# Patient Record
Sex: MALE | Race: WHITE | NOT HISPANIC OR LATINO | Employment: STUDENT | URBAN - METROPOLITAN AREA
[De-identification: names, ages, dates, MRNs, and addresses within clinical notes are randomized per-mention and may not be internally consistent; named-entity substitution may affect disease eponyms.]

---

## 2017-01-26 ENCOUNTER — OFFICE VISIT (OUTPATIENT)
Dept: INTERNAL MEDICINE | Facility: CLINIC | Age: 22
End: 2017-01-26
Payer: COMMERCIAL

## 2017-01-26 VITALS
WEIGHT: 149.06 LBS | HEIGHT: 70 IN | DIASTOLIC BLOOD PRESSURE: 68 MMHG | BODY MASS INDEX: 21.34 KG/M2 | TEMPERATURE: 98 F | HEART RATE: 76 BPM | SYSTOLIC BLOOD PRESSURE: 107 MMHG

## 2017-01-26 DIAGNOSIS — K52.9 IBD (INFLAMMATORY BOWEL DISEASE): ICD-10-CM

## 2017-01-26 DIAGNOSIS — F41.9 ANXIETY: ICD-10-CM

## 2017-01-26 DIAGNOSIS — J45.990 ASTHMA, EXERCISE INDUCED: Primary | ICD-10-CM

## 2017-01-26 DIAGNOSIS — G47.00 INSOMNIA, UNSPECIFIED TYPE: ICD-10-CM

## 2017-01-26 PROCEDURE — 99999 PR PBB SHADOW E&M-EST. PATIENT-LVL IV: CPT | Mod: PBBFAC,,, | Performed by: STUDENT IN AN ORGANIZED HEALTH CARE EDUCATION/TRAINING PROGRAM

## 2017-01-26 PROCEDURE — 99203 OFFICE O/P NEW LOW 30 MIN: CPT | Mod: S$GLB,,, | Performed by: STUDENT IN AN ORGANIZED HEALTH CARE EDUCATION/TRAINING PROGRAM

## 2017-01-26 PROCEDURE — 1159F MED LIST DOCD IN RCRD: CPT | Mod: S$GLB,,, | Performed by: STUDENT IN AN ORGANIZED HEALTH CARE EDUCATION/TRAINING PROGRAM

## 2017-01-26 RX ORDER — PAROXETINE HYDROCHLORIDE 20 MG/1
20 TABLET, FILM COATED ORAL EVERY MORNING
Qty: 30 TABLET | Refills: 11 | Status: SHIPPED | OUTPATIENT
Start: 2017-01-26 | End: 2018-01-26

## 2017-01-26 NOTE — PATIENT INSTRUCTIONS
Relaxation Therapy    Method 1: Progressive muscle relaxation    Theory: an individual can learn to relax one muscle at a time until the entire body is relaxed.    Practice:   1. Beginning with the muscles in the face, contract the muscles gently for one to two seconds and then relaxed. Repeat several times.  2. Use this technique for other muscle groups, usually in the following sequence: jaw and neck, upper arms, lower arms, fingers, chest, abdomen, buttocks, thighs, calves, and feet.  3. Repeat this cycle for approximately 45 minutes, if necessary.      Method 2: Relaxation response  1. Lie or sit comfortably.  2. Close your eyes and allow relaxation to spread throughout the body.  3. Establish a relaxed abdominal breathing pattern.  4. Redirect your thoughts away from everyday issues and toward a neutral mental focusing device such as a peaceful word or image.

## 2017-01-27 PROBLEM — R10.31 RLQ ABDOMINAL PAIN: Status: ACTIVE | Noted: 2017-01-27

## 2017-01-27 PROBLEM — F41.9 ANXIETY: Status: ACTIVE | Noted: 2017-01-27

## 2017-01-27 PROBLEM — J45.990 EXERCISE-INDUCED ASTHMA: Status: ACTIVE | Noted: 2017-01-27

## 2017-01-27 NOTE — PROGRESS NOTES
Clinic Note  01/27/2017    Subjective:       Patient ID: Darion Mendes is a 21 y.o. male being seen to establish care    Chief Complaint: Establish Care    HPI   Patient is a 22 y/o M with PMHx of asthma who presents to establish care and address few medical concerns:    Patient reports having flare-ups of RLQ pain that is associated with diarrhea and nausea. These symptoms onset at the age of 18, for which he had colonoscopies done at [facility] ([place], [place]) but denies biopsies being take and was not diagnosed with any etiology at the time. Patient reports these flare up with cramp-like abdominal pain that is localized to RLQ, does not radiate elsewhere, is constant for,a few days and then resolves spontaneously. During this time, he will have nausea and diarrhea frequently during the day. He denies have any blood in stool, hematemesis, fatigue, or unintended weight loss.  In 2013, he presented to the ER with similar symptoms and a resultant CT showed colitis of the ascending and proximal transverse colon. At that time, he was not admitted nor followed by GI. The last flare up was in 2015, during which he was in [place], had difficulty communicating with the doctors and ended up having his appendix removed.    Insomnia - for the past 1.5 weeks, the patient keeps waking up in the middle of the night for 1-1.5 hours before being able to fall back asleep. He denies any trouble falling asleep initially. He denies having problems like this before and suggests its likely due to anxiety. 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 These developments in the last 1-2 weeks has increased his anxiety. He has tried taking benadryl and nyquil; although that results in him being able to sleep through the night, he remains groggy the next day.    In the last past, he has seen a psychiatrist for depression and anxiety. For the last 5 months, the  psychiatrist has taught him behavioral & cognitive therapy, relaxation techniques however they are not working as effectively in light of new circumstances. Patient has intimally refused trial of medications, due to personal reserve about being dependent on them or having side effects, but at this time, he is willing to try something to help his anxiety. He will continue seeing the psychiatrist.    Asthma - mostly exercise-induced. Patient currently takes ProAir 30 minutes before exercise but claims he has cough/mild SOB that last up to 10-12 hours after exercise.       Past Medical History   Diagnosis Date    Asthma      Past Surgical History   Procedure Laterality Date    Appendectomy  2015     History reviewed. No pertinent family history.    Patient's Medications   New Prescriptions    IPRATROPIUM (ATROVENT HFA) 17 MCG/ACTUATION INHALER    Inhale 2 puffs into the lungs every 6 (six) hours. Rescue    PAROXETINE (PAXIL) 20 MG TABLET    Take 1 tablet (20 mg total) by mouth every morning.   Previous Medications    FLUTICASONE-SALMETEROL 100-50 MCG/DOSE (ADVAIR) 100-50 MCG/DOSE DISKUS INHALER    Inhale 1 puff into the lungs 2 (two) times daily.   Modified Medications    No medications on file   Discontinued Medications    MOMETASONE (NASONEX) 50 MCG/ACTUATION NASAL SPRAY    2 sprays by Nasal route once daily.       Patient Active Problem List    Diagnosis Date Noted    Chronic sinusitis 11/10/2014     Review of Systems   Constitutional: Negative for chills, fever and weight loss.   HENT: Negative for congestion and sore throat.    Eyes: Negative for blurred vision.   Respiratory: Positive for cough (infreqeuntly). Negative for sputum production, shortness of breath and wheezing.    Cardiovascular: Negative for chest pain, palpitations, claudication and leg swelling.   Gastrointestinal: Positive for nausea. Negative for abdominal pain, constipation, diarrhea, heartburn and vomiting.   Genitourinary: Negative for  "dysuria and flank pain.   Musculoskeletal: Negative for back pain, joint pain and myalgias.   Skin: Negative for rash.   Neurological: Negative for dizziness, weakness and headaches.   Psychiatric/Behavioral: Negative for depression. The patient has insomnia. The patient is not nervous/anxious.          Objective:      Visit Vitals    /68    Pulse 76    Temp 98.1 °F (36.7 °C) (Oral)    Ht 5' 10" (1.778 m)    Wt 67.6 kg (149 lb 0.5 oz)    BMI 21.38 kg/m2     Body mass index is 21.38 kg/(m^2).    Physical Exam   Constitutional: He is oriented to person, place, and time. He appears well-developed and well-nourished.   HENT:   Head: Normocephalic and atraumatic.   Eyes: Pupils are equal, round, and reactive to light.   Neck: No JVD present.   Cardiovascular: Normal rate, regular rhythm, normal heart sounds and intact distal pulses.    No murmur heard.  Pulmonary/Chest: Effort normal and breath sounds normal. No respiratory distress.   Abdominal: Soft. Bowel sounds are normal. He exhibits no distension. There is no tenderness.   Neurological: He is alert and oriented to person, place, and time.   Skin: Skin is warm and dry.   Psychiatric: He has a normal mood and affect. His behavior is normal. Judgment and thought content normal.       Assessment:         1. Asthma, exercise induced  ipratropium (ATROVENT HFA) 17 mcg/actuation inhaler   2. Anxiety  paroxetine (PAXIL) 20 MG tablet   3. IBD (inflammatory bowel disease)  Ambulatory consult to Gastroenterology         Plan:       Darion Mendes is a 21 y.o. male being seen to establish care    1. Asthma, exercise induced  - ipratropium (ATROVENT HFA) 17 mcg/actuation inhaler; Inhale 2 puffs into the lungs every 6 (six) hours. Rescue  Dispense: 1 Inhaler; Refill: 3  - prescribing ipratropium inhaler instead of albuterol given high efficacy in exercise-induced asthma. If does not alleviate symptom, will re-eval plan next visit    2. Anxiety  - has breathing exercises " and CBT in place by psychiatrist. Will continue  - paroxetine (PAXIL) 20 MG tablet; Take 1 tablet (20 mg total) by mouth every evening  - relaxation techniques provided  - will follow up in 8 weeks to evaluate progress and determine need to increase dose vs. Alter therapy.    3. Abdominal Pain   - flare-up consist with IBD vs. IBS. Likely IBD because colitis has been visualized in the past on CT scan  - Ambulatory consult to Gastroenterology  - patient asymptomatic right now -> has mild nausea that is anxiety related    4. Insomnia  - due to anxiety  - prescribing paroxetine 20mg to take at night, to also help with insomnia and prevent daytime sleepiness  - re-eval in 6-8 weeks or as needed.     RTC in 6-8 weeks or as needed before then.    Patient seen and plan of care discussed with Dr. Glenroy Kelly MD  Internal Medicine, PGY-1  [phone]

## 2017-01-27 NOTE — PROGRESS NOTES
Teaching Statement  I have personally taken the history and examined this patient and agree with the resident's history, exam and assessment and plan as stated above.  Sidney Tim MD

## 2019-03-07 NOTE — MR AVS SNAPSHOT
[facility]  [address]  [facility] [address]  Phone: [phone]  Fax: [phone]                  Darion Overton   2017 2:15 PM   Office Visit    Description:  Male : 1995   Provider:  Prudence Kelly MD   Department:  [facility]           Reason for Visit     Establish Care           Diagnoses this Visit        Comments    Asthma, exercise induced    -  Primary     Anxiety         IBD (inflammatory bowel disease)                To Do List           Goals (5 Years of Data)     None      Follow-Up and Disposition     Return in about 2 months (around 3/26/2017).       These Medications        Disp Refills Start End    ipratropium (ATROVENT HFA) 17 mcg/actuation inhaler 1 Inhaler 3 2017 2/3/2017    Inhale 2 puffs into the lungs every 6 (six) hours. Rescue - Inhalation    Pharmacy: [address] Ph #: [phone]       paroxetine (PAXIL) 20 MG tablet 30 tablet 11 2017    Take 1 tablet (20 mg total) by mouth every morning. - Oral    Pharmacy: [facility] Ph #: [phone]         [facility]     [facility] On Call Nurse Care Line - 24/7 Assistance  Registered nurses in the [facility] provide clinical advisement, health education, appointment booking, and other advisory services.  Call for this free service at [phone].             Medications           Message regarding Medications     Verify the changes and/or additions to your medication regime listed below are the same as discussed with your clinician today.  If any of these changes or additions are incorrect, please notify your healthcare provider.        START taking these NEW medications        Refills    ipratropium (ATROVENT HFA) 17 mcg/actuation inhaler 3    Sig: Inhale 2 puffs into the lungs every 6 (six) hours. Rescue    Class: Normal    Route:  "Anesthesia Release from PACU Note    Patient: Philip Mathis III    Procedure(s) Performed: Procedure(s) (LRB):  Radiofrequency Ablation (N/A)    Anesthesia type: GEN    Post pain: Adequate analgesia reported    Post assessment: no apparent anesthetic complications, tolerated procedure well and no evidence of recall    Post vital signs: BP (!) 109/57   Pulse 62   Temp 37.2 °C (99 °F) (Temporal)   Resp 18   Ht 6' 1" (1.854 m)   Wt 93 kg (205 lb 0.4 oz)   SpO2 (!) 88%   BMI 27.05 kg/m²     Level of consciousness: awake, alert and oriented    Nausea/Vomiting: no nausea/no vomiting    Complications: none    Airway Patency: patent    Respiratory: unassisted, spontaneous ventilation, nasal cannulae    Cardiovascular: stable and blood pressure at baseline    Hydration: euvolemic    " "Inhalation    paroxetine (PAXIL) 20 MG tablet 11    Sig: Take 1 tablet (20 mg total) by mouth every morning.    Class: Normal    Route: Oral      STOP taking these medications     mometasone (NASONEX) 50 mcg/actuation nasal spray 2 sprays by Nasal route once daily.           Verify that the below list of medications is an accurate representation of the medications you are currently taking.  If none reported, the list may be blank. If incorrect, please contact your healthcare provider. Carry this list with you in case of emergency.           Current Medications     fluticasone-salmeterol 100-50 mcg/dose (ADVAIR) 100-50 mcg/dose diskus inhaler Inhale 1 puff into the lungs 2 (two) times daily.    ipratropium (ATROVENT HFA) 17 mcg/actuation inhaler Inhale 2 puffs into the lungs every 6 (six) hours. Rescue    paroxetine (PAXIL) 20 MG tablet Take 1 tablet (20 mg total) by mouth every morning.           Clinical Reference Information           Vital Signs - Last Recorded  Most recent update: 1/26/2017  2:31 PM by Cait Henriquez MA    BP Pulse Temp Ht Wt BMI    107/68 76 98.1 °F (36.7 °C) (Oral) 5' 10" (1.778 m) 67.6 kg (149 lb 0.5 oz) 21.38 kg/m2      Blood Pressure          Most Recent Value    BP  107/68      Allergies as of 1/26/2017     No Known Allergies      Immunizations Administered on Date of Encounter - 1/26/2017     None      Orders Placed During Today's Visit      Normal Orders This Visit    Ambulatory consult to Gastroenterology       [facility] Sign-Up     Activating your [facility] account is as easy as 1-2-3!     1) Visit [email or web address], select Sign Up Now, enter this activation code and your date of birth, then select Next.  [email or web address]  Expires: 3/12/2017  3:40 PM      2) Create a username and password to use when you visit [facility] in the future and select a security question in case you lose your password and select Next.    3) Enter your e-mail address and click Sign Up!    Additional " Information  If you have questions, please e-mail [email or web address] or call [phone] to talk to our [facility] staff. Remember, [facility] is NOT to be used for urgent needs. For medical emergencies, dial 911.         Instructions    Relaxation Therapy    Method 1: Progressive muscle relaxation    Theory: an individual can learn to relax one muscle at a time until the entire body is relaxed.    Practice:   1. Beginning with the muscles in the face, contract the muscles gently for one to two seconds and then relaxed. Repeat several times.  2. Use this technique for other muscle groups, usually in the following sequence: jaw and neck, upper arms, lower arms, fingers, chest, abdomen, buttocks, thighs, calves, and feet.  3. Repeat this cycle for approximately 45 minutes, if necessary.      Method 2: Relaxation response  1. Lie or sit comfortably.  2. Close your eyes and allow relaxation to spread throughout the body.  3. Establish a relaxed abdominal breathing pattern.  4. Redirect your thoughts away from everyday issues and toward a neutral mental focusing device such as a peaceful word or image.